# Patient Record
Sex: MALE | Race: BLACK OR AFRICAN AMERICAN | Employment: FULL TIME | URBAN - METROPOLITAN AREA
[De-identification: names, ages, dates, MRNs, and addresses within clinical notes are randomized per-mention and may not be internally consistent; named-entity substitution may affect disease eponyms.]

---

## 2019-03-26 ENCOUNTER — HOSPITAL ENCOUNTER (EMERGENCY)
Age: 44
Discharge: HOME | End: 2019-03-26
Attending: EMERGENCY MEDICINE | Admitting: EMERGENCY MEDICINE
Payer: COMMERCIAL

## 2019-03-26 VITALS
RESPIRATION RATE: 16 BRPM | HEIGHT: 69 IN | TEMPERATURE: 97.8 F | OXYGEN SATURATION: 100 % | DIASTOLIC BLOOD PRESSURE: 95 MMHG | HEART RATE: 80 BPM | WEIGHT: 238.1 LBS | BODY MASS INDEX: 35.27 KG/M2 | SYSTOLIC BLOOD PRESSURE: 155 MMHG

## 2019-03-26 DIAGNOSIS — M54.42 ACUTE LEFT-SIDED LOW BACK PAIN WITH LEFT-SIDED SCIATICA: Primary | ICD-10-CM

## 2019-03-26 PROCEDURE — 99282 EMERGENCY DEPT VISIT SF MDM: CPT

## 2019-03-26 RX ORDER — CYCLOBENZAPRINE HCL 10 MG
10 TABLET ORAL
Qty: 10 TAB | Refills: 0 | Status: SHIPPED | OUTPATIENT
Start: 2019-03-26

## 2019-03-26 RX ORDER — PREDNISONE 10 MG/1
60 TABLET ORAL
Qty: 27 TAB | Refills: 0 | Status: SHIPPED | OUTPATIENT
Start: 2019-03-26

## 2019-03-26 NOTE — LETTER
[address] 
\[facility]\"" EMERGENCY DEPT 
[address] [place] [address] [address] [id number] [phone] Work/School Note Date: 3/26/2019 To Whom It May concern: 
 
Malcom Stinson was seen and treated today in the emergency room by the following provider(s): 
Attending Provider: Zayra Morales MD.   
 
Malcom Stinson please excuse from work on March 26, 2019 through March 27, 2019. Sincerely, David Nieto MD

## 2019-03-26 NOTE — ED PROVIDER NOTES
EMERGENCY DEPARTMENT HISTORY AND PHYSICAL EXAM 
 
 
Date: (Not on file) Patient Name: Malcom Stinson History of Presenting Illness Chief Complaint Patient presents with  Back Pain Ambulatory c/o L lower back pain radiating into L leg intermittently x month History Provided By: Patient HPI: Malcom Stinson, 41 y.o. 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 Patient presents today complaining of left-sided low back pain with radiation to the left buttock and intermittently into the left leg. This is been bothering patient approximately 1-2 months but has worsened in the past day. Patient denies any loss of consciousness, syncope, loss of bowel or bladder control or weakness in the leg. He has no history of sciatica or known spine problems. There are no other complaints, changes, or physical findings at this time. PCP: Teodora Koyanagi, NP No current facility-administered medications on file prior to encounter. No current outpatient medications on file prior to encounter. Past History Past Medical History: 
Past Medical History:  
Diagnosis Date  Asthma  Gastrointestinal disorder 89 IBS Past Surgical History: 
Past Surgical History:  
Procedure Laterality Date  HX APPENDECTOMY  HX CYSTECTOMY    
 from behind the right ear  HX HEENT Right 1995  
 cyst ear Family History: 
Family History Problem Relation Age of Onset  Heart Disease Maternal Grandfather  Diabetes Maternal Grandfather Social History: 
Social History Tobacco Use  Smoking status: Never Smoker  Smokeless tobacco: Never Used Substance Use Topics  Alcohol use: No  
 Drug use: No  
 
 
Allergies: Allergies Allergen Reactions  Pcn [Penicillins] Unknown (comments) Review of Systems Review of Systems Constitutional: Negative. HENT: Negative. Respiratory: Negative. Cardiovascular: Negative. Endocrine: Positive for polyuria. Genitourinary: Negative for difficulty urinating. Musculoskeletal: Positive for back pain. Negative for neck pain and neck stiffness. Neurological: Positive for numbness. Negative for weakness. Hematological: Negative for adenopathy. Bruises/bleeds easily. All other systems reviewed and are negative. Physical Exam  
Physical Exam  
Vital signs and nursing notes reviewed CONSTITUTIONAL: Alert, in no mild distress; well-developed; well-nourished. HEAD:  Normocephalic, atraumatic EYES: PERRL; EOM's intact. ENTM: Nose: no rhinorrhea; Throat: no erythema or exudate, mucous membranes moist 
Neck:  Supple. trachea is midline. RESP: Chest clear, equal breath sounds. - W/R/R 
CV: S1 and S2 WNL; No murmurs, gallops or rubs. 2+ radial and DP pulses bilaterally. GI: non-distended, normal bowel sounds, abdomen soft and non-tender. No masses or organomegaly. No midline pulsatile mass. : No costo-vertebral angle tenderness. BACK: No midline spine tenderness on palpation, patient does have pain in the left lumbar area that extends into the left buttock on palpation. Positive straight leg test when raising left leg. UPPER EXT:  Normal inspection. no joint or soft tissue swelling LOWER EXT: No edema, no calf tenderness. NEURO: Alert and oriented x3, 5/5 strength and light touch sensation intact in bilateral upper and lower extremities. Noted pain in the left lower back with transition from sitting to standing. SKIN: No rashes; Warm and dry PSYCH: Normal mood, normal affect Medical Decision Making I am the first provider for this patient. I reviewed the vital signs, available nursing notes, past medical history, past surgical history, family history and social history. Vital Signs-Reviewed the patient's vital signs. Patient Vitals for the past 12 hrs: 
 Temp Pulse Resp BP SpO2 03/26/19 0854 97.8 °F (36.6 °C) 80 16 (!) 155/95 100 % Records Reviewed: Old Medical Records Provider Notes (Medical Decision Making):  
51-year-old male presents with left low back pain with occasional radiation to the left leg consistent with left lower back pain with sciatica. No indication today of spinal cord compromise. Plan for steroid taper, muscle relaxers and exercises at home. Discussed return precautions patient including new weakness or loss of bowel bladder control. ED Course:  
Initial assessment performed. The patients presenting problems have been discussed, and they are in agreement with the care plan formulated and outlined with them. I have encouraged them to ask questions as they arise throughout their visit. Disposition: 
Discharge Discharge Note: 
10:24 AM 
The pt is ready for discharge. The pt's signs, symptoms, diagnosis, and discharge instructions have been discussed and pt has conveyed their understanding. The pt is to follow up as recommended or return to ER should their symptoms worsen. Plan has been discussed and pt is in agreement. PLAN: 
1. Discharge Medication List as of 3/26/2019  9:38 AM  
  
START taking these medications Details  
predniSONE (DELTASONE) 10 mg tablet Take 60 mg by mouth daily (with breakfast). Day 1 and 2: 60mg; Day 3: 50mg; Day 4:40mg; Day 5: 30 mg; Day 6: 20mg; Day 7: 10mg, Normal, Disp-27 Tab, R-0  
  
cyclobenzaprine (FLEXERIL) 10 mg tablet Take 1 Tab by mouth three (3) times daily as needed for Muscle Spasm(s) for up to 10 doses. , Normal, Disp-10 Tab, R-0  
  
  
 
2. Follow-up Information Follow up With Specialties Details Why Contact Info Baylee Cox NP Family Practice In 1 week Please follow-up with your primary care doctor in 1 week for reevaluation of your symptoms if still persisting. [address] [facility] [address] 
[phone] [facility] EMERGENCY DEPT Emergency Medicine  If symptoms worsen including new weakness in legs or loss of bowel or bladder control. [address] [facility] 
[phone] Return to ED if worse Diagnosis Clinical Impression: 1. Acute left-sided low back pain with left-sided sciatica

## 2019-03-26 NOTE — ED NOTES
Pt discharged by Dr Enriqueta Acevedo. Pt provided with discharge instructions Rx and instructions on follow up care. Pt out of ED under own power steady gait accompanied by self.